# Patient Record
Sex: MALE | Race: BLACK OR AFRICAN AMERICAN
[De-identification: names, ages, dates, MRNs, and addresses within clinical notes are randomized per-mention and may not be internally consistent; named-entity substitution may affect disease eponyms.]

---

## 2018-12-08 ENCOUNTER — HOSPITAL ENCOUNTER (EMERGENCY)
Dept: HOSPITAL 92 - ERS | Age: 23
Discharge: HOME | End: 2018-12-08
Payer: SELF-PAY

## 2018-12-08 DIAGNOSIS — F20.9: ICD-10-CM

## 2018-12-08 DIAGNOSIS — Z79.899: ICD-10-CM
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
## 2018-12-08 DIAGNOSIS — M79.604: Primary | ICD-10-CM

## 2018-12-08 DIAGNOSIS — F31.9: ICD-10-CM

## 2018-12-08 PROCEDURE — 99283 EMERGENCY DEPT VISIT LOW MDM: CPT

## 2020-06-01 ENCOUNTER — HOSPITAL ENCOUNTER (EMERGENCY)
Dept: HOSPITAL 92 - ERS | Age: 25
Discharge: PSYCHIATRIC OR SUBSTANCE USE FACILITY | End: 2020-06-01
Payer: MEDICARE

## 2020-06-01 DIAGNOSIS — F31.9: ICD-10-CM

## 2020-06-01 DIAGNOSIS — R45.1: ICD-10-CM

## 2020-06-01 DIAGNOSIS — F20.9: ICD-10-CM

## 2020-06-01 DIAGNOSIS — F29: Primary | ICD-10-CM

## 2020-06-01 DIAGNOSIS — Z79.899: ICD-10-CM

## 2020-06-01 LAB
ALBUMIN SERPL BCG-MCNC: 4.7 G/DL (ref 3.5–5)
ALP SERPL-CCNC: 48 U/L (ref 40–110)
ALT SERPL W P-5'-P-CCNC: 24 U/L (ref 8–55)
ANION GAP SERPL CALC-SCNC: 13 MMOL/L (ref 10–20)
APAP SERPL-MCNC: (no result) MCG/ML (ref 10–30)
AST SERPL-CCNC: 31 U/L (ref 5–34)
BASOPHILS # BLD AUTO: 0.1 THOU/UL (ref 0–0.2)
BASOPHILS NFR BLD AUTO: 0.9 % (ref 0–1)
BILIRUB SERPL-MCNC: 0.4 MG/DL (ref 0.2–1.2)
BUN SERPL-MCNC: 15 MG/DL (ref 8.9–20.6)
CALCIUM SERPL-MCNC: 9.9 MG/DL (ref 7.8–10.44)
CHLORIDE SERPL-SCNC: 105 MMOL/L (ref 98–107)
CK SERPL-CCNC: 513 U/L (ref 30–200)
CO2 SERPL-SCNC: 26 MMOL/L (ref 22–29)
CREAT CL PREDICTED SERPL C-G-VRATE: 0 ML/MIN (ref 70–130)
DRUG SCREEN CUTOFF: (no result)
EOSINOPHIL # BLD AUTO: 0 THOU/UL (ref 0–0.7)
EOSINOPHIL NFR BLD AUTO: 0.4 % (ref 0–10)
GLOBULIN SER CALC-MCNC: 3 G/DL (ref 2.4–3.5)
GLUCOSE SERPL-MCNC: 78 MG/DL (ref 70–105)
HGB BLD-MCNC: 14.1 G/DL (ref 14–18)
LYMPHOCYTES # BLD: 1.6 THOU/UL (ref 1.2–3.4)
LYMPHOCYTES NFR BLD AUTO: 21.8 % (ref 21–51)
MCH RBC QN AUTO: 32 PG (ref 27–31)
MCV RBC AUTO: 100 FL (ref 78–98)
MEDTOX CONTROL LINE VALID?: (no result)
MEDTOX READER #: (no result)
MONOCYTES # BLD AUTO: 0.6 THOU/UL (ref 0.11–0.59)
MONOCYTES NFR BLD AUTO: 8.2 % (ref 0–10)
NEUTROPHILS # BLD AUTO: 5 THOU/UL (ref 1.4–6.5)
NEUTROPHILS NFR BLD AUTO: 68.7 % (ref 42–75)
PLATELET # BLD AUTO: 30 THOU/UL (ref 130–400)
POTASSIUM SERPL-SCNC: 4.6 MMOL/L (ref 3.5–5.1)
PROT UR STRIP.AUTO-MCNC: 10 MG/DL
RBC # BLD AUTO: 4.4 MILL/UL (ref 4.7–6.1)
SALICYLATES SERPL-MCNC: (no result) MG/DL (ref 15–30)
SODIUM SERPL-SCNC: 139 MMOL/L (ref 136–145)
WBC # BLD AUTO: 7.3 THOU/UL (ref 4.8–10.8)

## 2020-06-01 PROCEDURE — 80053 COMPREHEN METABOLIC PANEL: CPT

## 2020-06-01 PROCEDURE — 85025 COMPLETE CBC W/AUTO DIFF WBC: CPT

## 2020-06-01 PROCEDURE — 81003 URINALYSIS AUTO W/O SCOPE: CPT

## 2020-06-01 PROCEDURE — 82550 ASSAY OF CK (CPK): CPT

## 2020-06-01 PROCEDURE — 80306 DRUG TEST PRSMV INSTRMNT: CPT

## 2020-06-01 PROCEDURE — 80307 DRUG TEST PRSMV CHEM ANLYZR: CPT

## 2020-06-01 PROCEDURE — 93005 ELECTROCARDIOGRAM TRACING: CPT

## 2020-06-01 PROCEDURE — 84443 ASSAY THYROID STIM HORMONE: CPT

## 2020-06-06 NOTE — EKG
Test Reason : 

Blood Pressure : ***/*** mmHG

Vent. Rate : 064 BPM     Atrial Rate : 064 BPM

   P-R Int : 184 ms          QRS Dur : 088 ms

    QT Int : 346 ms       P-R-T Axes : 034 061 027 degrees

   QTc Int : 356 ms

 

Sinus rhythm with sinus arrhythmia with occasional Premature ventricular complexes

Early repolarization

Otherwise normal ECG

 

Confirmed by TURNER MURILLO ([id number]),  TEMI JONES ([id number]) on 6/6/2020 1:56:23 PM

 

Referred By:             Confirmed By:TURNER MURILLO

## 2020-06-15 ENCOUNTER — HOSPITAL ENCOUNTER (INPATIENT)
Dept: HOSPITAL 92 - ERS | Age: 25
LOS: 9 days | Discharge: HOME | DRG: 3 | End: 2020-06-24
Attending: SURGERY | Admitting: SURGERY
Payer: MEDICARE

## 2020-06-15 VITALS — BODY MASS INDEX: 18.1 KG/M2

## 2020-06-15 DIAGNOSIS — E83.39: ICD-10-CM

## 2020-06-15 DIAGNOSIS — E83.51: ICD-10-CM

## 2020-06-15 DIAGNOSIS — E87.6: ICD-10-CM

## 2020-06-15 DIAGNOSIS — D62: ICD-10-CM

## 2020-06-15 DIAGNOSIS — J96.00: ICD-10-CM

## 2020-06-15 DIAGNOSIS — R40.2432: ICD-10-CM

## 2020-06-15 DIAGNOSIS — E83.42: ICD-10-CM

## 2020-06-15 DIAGNOSIS — S02.69XA: Primary | ICD-10-CM

## 2020-06-15 DIAGNOSIS — S11.90XA: ICD-10-CM

## 2020-06-15 DIAGNOSIS — N17.9: ICD-10-CM

## 2020-06-15 DIAGNOSIS — S45.192A: ICD-10-CM

## 2020-06-15 DIAGNOSIS — F20.9: ICD-10-CM
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
## 2020-06-15 DIAGNOSIS — S41.001A: ICD-10-CM

## 2020-06-15 DIAGNOSIS — T79.4XXA: ICD-10-CM

## 2020-06-15 DIAGNOSIS — E87.2: ICD-10-CM

## 2020-06-15 DIAGNOSIS — D69.6: ICD-10-CM

## 2020-06-15 DIAGNOSIS — S41.101A: ICD-10-CM

## 2020-06-15 LAB
ALBUMIN SERPL BCG-MCNC: 2.7 G/DL (ref 3.5–5)
ALP SERPL-CCNC: 27 U/L (ref 40–110)
ALT SERPL W P-5'-P-CCNC: 20 U/L (ref 8–55)
ANALYZER IN CARDIO: (no result)
ANALYZER IN CARDIO: (no result)
ANION GAP SERPL CALC-SCNC: 10 MMOL/L (ref 10–20)
ANION GAP SERPL CALC-SCNC: 25 MMOL/L (ref 10–20)
APTT PPP: 34.9 SEC (ref 22.9–36.1)
AST SERPL-CCNC: 27 U/L (ref 5–34)
BASE EXCESS STD BLDA CALC-SCNC: -3.9 MEQ/L
BASE EXCESS STD BLDV CALC-SCNC: -7.1 MEQ/L
BASOPHILS # BLD AUTO: 0 THOU/UL (ref 0–0.2)
BASOPHILS NFR BLD AUTO: 0.1 % (ref 0–1)
BILIRUB SERPL-MCNC: 0.2 MG/DL (ref 0.2–1.2)
BUN SERPL-MCNC: 12 MG/DL (ref 8.9–20.6)
BUN SERPL-MCNC: 14 MG/DL (ref 8.9–20.6)
BURR CELLS BLD QL SMEAR: (no result) (100X)
CA-I BLDA-SCNC: 1.17 MMOL/L (ref 1.12–1.3)
CA-I BLDV-MCNC: 1.12 MMOL/L (ref 1.16–1.32)
CALCIUM SERPL-MCNC: 13.3 MG/DL (ref 7.8–10.44)
CALCIUM SERPL-MCNC: 8.3 MG/DL (ref 7.8–10.44)
CHLORIDE BLDV-SCNC: 106 MMOL/L (ref 98–106)
CHLORIDE SERPL-SCNC: 108 MMOL/L (ref 98–107)
CHLORIDE SERPL-SCNC: 113 MMOL/L (ref 98–107)
CO2 SERPL-SCNC: 21 MMOL/L (ref 22–29)
CO2 SERPL-SCNC: 9 MMOL/L (ref 22–29)
CREAT CL PREDICTED SERPL C-G-VRATE: 0 ML/MIN (ref 70–130)
CREAT CL PREDICTED SERPL C-G-VRATE: 113 ML/MIN (ref 70–130)
EOSINOPHIL # BLD AUTO: 0.1 THOU/UL (ref 0–0.7)
EOSINOPHIL NFR BLD AUTO: 0.9 % (ref 0–10)
GLOBULIN SER CALC-MCNC: 1.6 G/DL (ref 2.4–3.5)
GLUCOSE SERPL-MCNC: 126 MG/DL (ref 70–105)
GLUCOSE SERPL-MCNC: 303 MG/DL (ref 70–105)
GLUCOSE UR STRIP-MCNC: 100 MG/DL
HCO3 BLDA-SCNC: 18.9 MEQ/L (ref 22–28)
HCO3 BLDV-SCNC: 20 MEQ/L (ref 22–28)
HCT VFR BLDA CALC: 31 % (ref 42–52)
HCT VFR BLDV CALC: 25 % (ref 42–52)
HGB BLD-MCNC: 10.5 G/DL (ref 14–18)
HGB BLD-MCNC: 7.8 G/DL (ref 14–18)
HGB BLDA-MCNC: 10.5 G/DL (ref 14–18)
HGB BLDV-MCNC: 8.6 G/DL (ref 13.2–17.3)
INR PPP: 1.5
LIPASE SERPL-CCNC: 25 U/L (ref 8–78)
LYMPHOCYTES # BLD: 1.1 THOU/UL (ref 1.2–3.4)
LYMPHOCYTES NFR BLD AUTO: 8.3 % (ref 21–51)
MAGNESIUM SERPL-MCNC: 1.7 MG/DL (ref 1.6–2.6)
MCH RBC QN AUTO: 30 PG (ref 27–31)
MCH RBC QN AUTO: 32.7 PG (ref 27–31)
MCV RBC AUTO: 90.2 FL (ref 78–98)
MCV RBC AUTO: 93.3 FL (ref 78–98)
MDIFF COMPLETE?: YES
MDIFF COMPLETE?: YES
MONOCYTES # BLD AUTO: 0.3 THOU/UL (ref 0.11–0.59)
MONOCYTES NFR BLD AUTO: 2.3 % (ref 0–10)
NEUTROPHILS # BLD AUTO: 11.8 THOU/UL (ref 1.4–6.5)
NEUTROPHILS NFR BLD AUTO: 88.5 % (ref 42–75)
PCO2 BLDA: 27.2 MMHG (ref 35–45)
PH BLDA: 7.46 [PH] (ref 7.35–7.45)
PLATELET # BLD AUTO: 60 THOU/UL (ref 130–400)
PLATELET # BLD AUTO: 77 THOU/UL (ref 130–400)
PO2 BLDA: 399.4 MMHG (ref 80–100)
POLYCHROMASIA BLD QL SMEAR: (no result) (100X)
POLYCHROMASIA BLD QL SMEAR: (no result) (100X)
POTASSIUM BLD-SCNC: 4.02 MMOL/L (ref 3.7–5.3)
POTASSIUM BLDV-SCNC: 5.11 MMOL/L (ref 3.7–5.3)
POTASSIUM SERPL-SCNC: 4 MMOL/L (ref 3.5–5.1)
POTASSIUM SERPL-SCNC: 5.2 MMOL/L (ref 3.5–5.1)
PROTHROMBIN TIME: 17.7 SEC (ref 12–14.7)
RBC # BLD AUTO: 2.61 MILL/UL (ref 4.7–6.1)
RBC # BLD AUTO: 3.22 MILL/UL (ref 4.7–6.1)
SCHISTOCYTES BLD QL SMEAR: (no result) (100X)
SODIUM BLDV-SCNC: 134.4 MMOL/L (ref 133–146)
SODIUM SERPL-SCNC: 137 MMOL/L (ref 136–145)
SODIUM SERPL-SCNC: 140 MMOL/L (ref 136–145)
SPECIMEN DRAWN FROM PATIENT: (no result)
WBC # BLD AUTO: 13.3 THOU/UL (ref 4.8–10.8)
WBC # BLD AUTO: 13.6 THOU/UL (ref 4.8–10.8)
WBC UR QL AUTO: (no result) HPF (ref 0–3)

## 2020-06-15 PROCEDURE — 93005 ELECTROCARDIOGRAM TRACING: CPT

## 2020-06-15 PROCEDURE — 86850 RBC ANTIBODY SCREEN: CPT

## 2020-06-15 PROCEDURE — P9059 PLASMA, FRZ BETWEEN 8-24HOUR: HCPCS

## 2020-06-15 PROCEDURE — 84100 ASSAY OF PHOSPHORUS: CPT

## 2020-06-15 PROCEDURE — 03L80ZZ OCCLUSION OF LEFT BRACHIAL ARTERY, OPEN APPROACH: ICD-10-PCS | Performed by: THORACIC SURGERY (CARDIOTHORACIC VASCULAR SURGERY)

## 2020-06-15 PROCEDURE — 90715 TDAP VACCINE 7 YRS/> IM: CPT

## 2020-06-15 PROCEDURE — 80307 DRUG TEST PRSMV CHEM ANLYZR: CPT

## 2020-06-15 PROCEDURE — 96374 THER/PROPH/DIAG INJ IV PUSH: CPT

## 2020-06-15 PROCEDURE — 71045 X-RAY EXAM CHEST 1 VIEW: CPT

## 2020-06-15 PROCEDURE — 96365 THER/PROPH/DIAG IV INF INIT: CPT

## 2020-06-15 PROCEDURE — 70486 CT MAXILLOFACIAL W/O DYE: CPT

## 2020-06-15 PROCEDURE — 80053 COMPREHEN METABOLIC PANEL: CPT

## 2020-06-15 PROCEDURE — 86900 BLOOD TYPING SEROLOGIC ABO: CPT

## 2020-06-15 PROCEDURE — 0BJ08ZZ INSPECTION OF TRACHEOBRONCHIAL TREE, VIA NATURAL OR ARTIFICIAL OPENING ENDOSCOPIC: ICD-10-PCS | Performed by: SURGERY

## 2020-06-15 PROCEDURE — 83605 ASSAY OF LACTIC ACID: CPT

## 2020-06-15 PROCEDURE — P9048 PLASMAPROTEIN FRACT,5%,250ML: HCPCS

## 2020-06-15 PROCEDURE — 81003 URINALYSIS AUTO W/O SCOPE: CPT

## 2020-06-15 PROCEDURE — 94002 VENT MGMT INPAT INIT DAY: CPT

## 2020-06-15 PROCEDURE — 93970 EXTREMITY STUDY: CPT

## 2020-06-15 PROCEDURE — 85027 COMPLETE CBC AUTOMATED: CPT

## 2020-06-15 PROCEDURE — P9016 RBC LEUKOCYTES REDUCED: HCPCS

## 2020-06-15 PROCEDURE — 02HV33Z INSERTION OF INFUSION DEVICE INTO SUPERIOR VENA CAVA, PERCUTANEOUS APPROACH: ICD-10-PCS | Performed by: THORACIC SURGERY (CARDIOTHORACIC VASCULAR SURGERY)

## 2020-06-15 PROCEDURE — 30233L1 TRANSFUSION OF NONAUTOLOGOUS FRESH PLASMA INTO PERIPHERAL VEIN, PERCUTANEOUS APPROACH: ICD-10-PCS | Performed by: SURGERY

## 2020-06-15 PROCEDURE — 90471 IMMUNIZATION ADMIN: CPT

## 2020-06-15 PROCEDURE — C9113 INJ PANTOPRAZOLE SODIUM, VIA: HCPCS

## 2020-06-15 PROCEDURE — 85007 BL SMEAR W/DIFF WBC COUNT: CPT

## 2020-06-15 PROCEDURE — 85730 THROMBOPLASTIN TIME PARTIAL: CPT

## 2020-06-15 PROCEDURE — 82805 BLOOD GASES W/O2 SATURATION: CPT

## 2020-06-15 PROCEDURE — 80048 BASIC METABOLIC PNL TOTAL CA: CPT

## 2020-06-15 PROCEDURE — G0390 TRAUMA RESPONS W/HOSP CRITI: HCPCS

## 2020-06-15 PROCEDURE — 36416 COLLJ CAPILLARY BLOOD SPEC: CPT

## 2020-06-15 PROCEDURE — 70450 CT HEAD/BRAIN W/O DYE: CPT

## 2020-06-15 PROCEDURE — 94640 AIRWAY INHALATION TREATMENT: CPT

## 2020-06-15 PROCEDURE — 83735 ASSAY OF MAGNESIUM: CPT

## 2020-06-15 PROCEDURE — 96375 TX/PRO/DX INJ NEW DRUG ADDON: CPT

## 2020-06-15 PROCEDURE — 85025 COMPLETE CBC W/AUTO DIFF WBC: CPT

## 2020-06-15 PROCEDURE — 30233N1 TRANSFUSION OF NONAUTOLOGOUS RED BLOOD CELLS INTO PERIPHERAL VEIN, PERCUTANEOUS APPROACH: ICD-10-PCS | Performed by: SURGERY

## 2020-06-15 PROCEDURE — 96366 THER/PROPH/DIAG IV INF ADDON: CPT

## 2020-06-15 PROCEDURE — 36556 INSERT NON-TUNNEL CV CATH: CPT

## 2020-06-15 PROCEDURE — 74177 CT ABD & PELVIS W/CONTRAST: CPT

## 2020-06-15 PROCEDURE — 85610 PROTHROMBIN TIME: CPT

## 2020-06-15 PROCEDURE — 86901 BLOOD TYPING SEROLOGIC RH(D): CPT

## 2020-06-15 PROCEDURE — 94003 VENT MGMT INPAT SUBQ DAY: CPT

## 2020-06-15 PROCEDURE — 81015 MICROSCOPIC EXAM OF URINE: CPT

## 2020-06-15 PROCEDURE — 71260 CT THORAX DX C+: CPT

## 2020-06-15 PROCEDURE — 30233K1 TRANSFUSION OF NONAUTOLOGOUS FROZEN PLASMA INTO PERIPHERAL VEIN, PERCUTANEOUS APPROACH: ICD-10-PCS | Performed by: SURGERY

## 2020-06-15 PROCEDURE — 36430 TRANSFUSION BLD/BLD COMPNT: CPT

## 2020-06-15 PROCEDURE — 83690 ASSAY OF LIPASE: CPT

## 2020-06-15 PROCEDURE — 36415 COLL VENOUS BLD VENIPUNCTURE: CPT

## 2020-06-15 PROCEDURE — 70498 CT ANGIOGRAPHY NECK: CPT

## 2020-06-15 PROCEDURE — 51702 INSERT TEMP BLADDER CATH: CPT

## 2020-06-15 RX ADMIN — CEFAZOLIN SODIUM SCH MLS: 2 SOLUTION INTRAVENOUS at 20:10

## 2020-06-15 RX ADMIN — FENTANYL CITRATE SCH MLS: 50 INJECTION, SOLUTION INTRAMUSCULAR; INTRAVENOUS at 17:08
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
## 2020-06-16 LAB
ANALYZER IN CARDIO: (no result)
ANION GAP SERPL CALC-SCNC: 7 MMOL/L (ref 10–20)
BASE EXCESS STD BLDA CALC-SCNC: -2.2 MEQ/L
BASOPHILS # BLD AUTO: 0 THOU/UL (ref 0–0.2)
BASOPHILS NFR BLD AUTO: 0.1 % (ref 0–1)
BUN SERPL-MCNC: 13 MG/DL (ref 8.9–20.6)
CA-I BLDA-SCNC: 1.12 MMOL/L (ref 1.12–1.3)
CALCIUM SERPL-MCNC: 7.9 MG/DL (ref 7.8–10.44)
CHLORIDE SERPL-SCNC: 110 MMOL/L (ref 98–107)
CO2 SERPL-SCNC: 26 MMOL/L (ref 22–29)
CREAT CL PREDICTED SERPL C-G-VRATE: 95 ML/MIN (ref 70–130)
EOSINOPHIL # BLD AUTO: 0 THOU/UL (ref 0–0.7)
EOSINOPHIL NFR BLD AUTO: 0.1 % (ref 0–10)
GLUCOSE SERPL-MCNC: 90 MG/DL (ref 70–105)
HCO3 BLDA-SCNC: 21.4 MEQ/L (ref 22–28)
HCT VFR BLDA CALC: 25 % (ref 42–52)
HGB BLD-MCNC: 9.3 G/DL (ref 14–18)
HGB BLDA-MCNC: 8.5 G/DL (ref 14–18)
LYMPHOCYTES # BLD: 1.5 THOU/UL (ref 1.2–3.4)
LYMPHOCYTES NFR BLD AUTO: 12.9 % (ref 21–51)
MAGNESIUM SERPL-MCNC: 1.5 MG/DL (ref 1.6–2.6)
MCH RBC QN AUTO: 30.9 PG (ref 27–31)
MCV RBC AUTO: 90.7 FL (ref 78–98)
MONOCYTES # BLD AUTO: 1 THOU/UL (ref 0.11–0.59)
MONOCYTES NFR BLD AUTO: 8.6 % (ref 0–10)
NEUTROPHILS # BLD AUTO: 9.3 THOU/UL (ref 1.4–6.5)
NEUTROPHILS NFR BLD AUTO: 78.3 % (ref 42–75)
O2 A-A PPRESDIFF RESPIRATORY: 57.23 MM[HG] (ref 0–20)
PCO2 BLDA: 31.9 MMHG (ref 35–45)
PH BLDA: 7.45 [PH] (ref 7.35–7.45)
PLATELET # BLD AUTO: 66 THOU/UL (ref 130–400)
PO2 BLDA: 188.1 MMHG (ref 80–100)
POTASSIUM BLD-SCNC: 3.85 MMOL/L (ref 3.7–5.3)
POTASSIUM SERPL-SCNC: 4.1 MMOL/L (ref 3.5–5.1)
RBC # BLD AUTO: 3.01 MILL/UL (ref 4.7–6.1)
SODIUM SERPL-SCNC: 139 MMOL/L (ref 136–145)
SPECIMEN DRAWN FROM PATIENT: (no result)
WBC # BLD AUTO: 11.8 THOU/UL (ref 4.8–10.8)

## 2020-06-16 PROCEDURE — 0DH63UZ INSERTION OF FEEDING DEVICE INTO STOMACH, PERCUTANEOUS APPROACH: ICD-10-PCS | Performed by: SURGERY

## 2020-06-16 PROCEDURE — 5A1955Z RESPIRATORY VENTILATION, GREATER THAN 96 CONSECUTIVE HOURS: ICD-10-PCS | Performed by: SURGERY

## 2020-06-16 PROCEDURE — 0B113F4 BYPASS TRACHEA TO CUTANEOUS WITH TRACHEOSTOMY DEVICE, PERCUTANEOUS APPROACH: ICD-10-PCS | Performed by: SURGERY

## 2020-06-16 RX ADMIN — CEFAZOLIN SODIUM SCH MLS: 2 SOLUTION INTRAVENOUS at 05:16

## 2020-06-16 RX ADMIN — FENTANYL CITRATE SCH MLS: 50 INJECTION, SOLUTION INTRAMUSCULAR; INTRAVENOUS at 11:33

## 2020-06-16 RX ADMIN — CEFAZOLIN SODIUM SCH MLS: 2 SOLUTION INTRAVENOUS at 10:25

## 2020-06-16 RX ADMIN — CEFAZOLIN SODIUM SCH MLS: 2 SOLUTION INTRAVENOUS at 21:04
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
## 2020-06-17 LAB
ANION GAP SERPL CALC-SCNC: 11 MMOL/L (ref 10–20)
BUN SERPL-MCNC: 16 MG/DL (ref 8.9–20.6)
CALCIUM SERPL-MCNC: 7.7 MG/DL (ref 7.8–10.44)
CHLORIDE SERPL-SCNC: 109 MMOL/L (ref 98–107)
CO2 SERPL-SCNC: 23 MMOL/L (ref 22–29)
CREAT CL PREDICTED SERPL C-G-VRATE: 102 ML/MIN (ref 70–130)
GLUCOSE SERPL-MCNC: 102 MG/DL (ref 70–105)
HGB BLD-MCNC: 7 G/DL (ref 14–18)
MAGNESIUM SERPL-MCNC: 1.9 MG/DL (ref 1.6–2.6)
MCH RBC QN AUTO: 31.1 PG (ref 27–31)
MCV RBC AUTO: 94.7 FL (ref 78–98)
MDIFF COMPLETE?: YES
PLATELET # BLD AUTO: 52 THOU/UL (ref 130–400)
POLYCHROMASIA BLD QL SMEAR: (no result) (100X)
POTASSIUM SERPL-SCNC: 4.4 MMOL/L (ref 3.5–5.1)
RBC # BLD AUTO: 2.23 MILL/UL (ref 4.7–6.1)
SODIUM SERPL-SCNC: 139 MMOL/L (ref 136–145)
WBC # BLD AUTO: 12.9 THOU/UL (ref 4.8–10.8)

## 2020-06-17 RX ADMIN — DOCUSATE SODIUM 50 MG AND SENNOSIDES 8.6 MG SCH TAB: 8.6; 5 TABLET, FILM COATED ORAL at 21:04

## 2020-06-17 RX ADMIN — CEFAZOLIN SODIUM SCH MLS: 2 SOLUTION INTRAVENOUS at 03:23

## 2020-06-17 RX ADMIN — CEFAZOLIN SODIUM SCH MLS: 2 SOLUTION INTRAVENOUS at 11:30

## 2020-06-17 RX ADMIN — CEFAZOLIN SODIUM SCH MLS: 2 SOLUTION INTRAVENOUS at 21:04
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
## 2020-06-18 LAB
ANION GAP SERPL CALC-SCNC: 8 MMOL/L (ref 10–20)
BUN SERPL-MCNC: 15 MG/DL (ref 8.9–20.6)
CALCIUM SERPL-MCNC: 7.9 MG/DL (ref 7.8–10.44)
CHLORIDE SERPL-SCNC: 105 MMOL/L (ref 98–107)
CO2 SERPL-SCNC: 29 MMOL/L (ref 22–29)
CREAT CL PREDICTED SERPL C-G-VRATE: 131 ML/MIN (ref 70–130)
GLUCOSE SERPL-MCNC: 107 MG/DL (ref 70–105)
HGB BLD-MCNC: 5.9 G/DL (ref 14–18)
MAGNESIUM SERPL-MCNC: 1.9 MG/DL (ref 1.6–2.6)
MCH RBC QN AUTO: 31 PG (ref 27–31)
MCV RBC AUTO: 94.4 FL (ref 78–98)
MDIFF COMPLETE?: YES
PLATELET # BLD AUTO: 54 THOU/UL (ref 130–400)
POTASSIUM SERPL-SCNC: 3.9 MMOL/L (ref 3.5–5.1)
RBC # BLD AUTO: 1.89 MILL/UL (ref 4.7–6.1)
SODIUM SERPL-SCNC: 138 MMOL/L (ref 136–145)
WBC # BLD AUTO: 8.6 THOU/UL (ref 4.8–10.8)

## 2020-06-18 PROCEDURE — 05H533Z INSERTION OF INFUSION DEVICE INTO RIGHT SUBCLAVIAN VEIN, PERCUTANEOUS APPROACH: ICD-10-PCS | Performed by: SURGERY

## 2020-06-18 PROCEDURE — 0NSV04Z REPOSITION LEFT MANDIBLE WITH INTERNAL FIXATION DEVICE, OPEN APPROACH: ICD-10-PCS | Performed by: SURGERY

## 2020-06-18 RX ADMIN — CEFAZOLIN SODIUM SCH MLS: 2 SOLUTION INTRAVENOUS at 12:43

## 2020-06-18 RX ADMIN — DIVALPROEX SODIUM SCH MG: 500 TABLET, DELAYED RELEASE ORAL at 08:47

## 2020-06-18 RX ADMIN — CEFAZOLIN SODIUM SCH MLS: 2 SOLUTION INTRAVENOUS at 03:55

## 2020-06-18 RX ADMIN — DIVALPROEX SODIUM SCH: 500 TABLET, DELAYED RELEASE ORAL at 21:12

## 2020-06-18 RX ADMIN — DIVALPROEX SODIUM SCH MG: 125 CAPSULE, COATED PELLETS ORAL at 21:18

## 2020-06-18 RX ADMIN — Medication SCH MG: at 21:01

## 2020-06-18 RX ADMIN — DOCUSATE SODIUM 50 MG AND SENNOSIDES 8.6 MG SCH TAB: 8.6; 5 TABLET, FILM COATED ORAL at 08:48

## 2020-06-18 RX ADMIN — DOCUSATE SODIUM 50 MG AND SENNOSIDES 8.6 MG SCH TAB: 8.6; 5 TABLET, FILM COATED ORAL at 20:55

## 2020-06-18 RX ADMIN — CEFAZOLIN SODIUM SCH MLS: 2 SOLUTION INTRAVENOUS at 20:54

## 2020-06-18 RX ADMIN — Medication SCH MG: at 08:47
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
## 2020-06-18 NOTE — RAD
CHEST 1 VIEW:

 

Date:  06/18/2020

 

HISTORY:  

Right subclavian line placement. 

 

FINDINGS:

Tracheostomy tube in place. Right subclavian central line placed with tip in the superior vena cava. 
No pneumothorax or pleural effusion. 

 

IMPRESSION: 

Right central line placement without pneumothorax or pleural effusion. No evidence for other acute pr
ocess. 

 

 

POS: [facility]
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
## 2020-06-19 LAB
ANION GAP SERPL CALC-SCNC: 8 MMOL/L (ref 10–20)
BUN SERPL-MCNC: 10 MG/DL (ref 8.9–20.6)
CALCIUM SERPL-MCNC: 7.7 MG/DL (ref 7.8–10.44)
CHLORIDE SERPL-SCNC: 105 MMOL/L (ref 98–107)
CO2 SERPL-SCNC: 30 MMOL/L (ref 22–29)
CREAT CL PREDICTED SERPL C-G-VRATE: 148 ML/MIN (ref 70–130)
GLUCOSE SERPL-MCNC: 115 MG/DL (ref 70–105)
HGB BLD-MCNC: 8.6 G/DL (ref 14–18)
MAGNESIUM SERPL-MCNC: 1.8 MG/DL (ref 1.6–2.6)
MCH RBC QN AUTO: 30.3 PG (ref 27–31)
MCV RBC AUTO: 92.7 FL (ref 78–98)
MDIFF COMPLETE?: YES
PLATELET # BLD AUTO: 68 THOU/UL (ref 130–400)
POTASSIUM SERPL-SCNC: 3.7 MMOL/L (ref 3.5–5.1)
RBC # BLD AUTO: 2.82 MILL/UL (ref 4.7–6.1)
SODIUM SERPL-SCNC: 139 MMOL/L (ref 136–145)
WBC # BLD AUTO: 8.4 THOU/UL (ref 4.8–10.8)

## 2020-06-19 RX ADMIN — Medication SCH MG: at 21:03

## 2020-06-19 RX ADMIN — Medication SCH MG: at 08:10

## 2020-06-19 RX ADMIN — CEFAZOLIN SODIUM SCH MLS: 2 SOLUTION INTRAVENOUS at 21:04

## 2020-06-19 RX ADMIN — DOCUSATE SODIUM 50 MG AND SENNOSIDES 8.6 MG SCH TAB: 8.6; 5 TABLET, FILM COATED ORAL at 21:03

## 2020-06-19 RX ADMIN — DOCUSATE SODIUM 50 MG AND SENNOSIDES 8.6 MG SCH TAB: 8.6; 5 TABLET, FILM COATED ORAL at 08:10

## 2020-06-19 RX ADMIN — DIVALPROEX SODIUM SCH MG: 125 CAPSULE, COATED PELLETS ORAL at 08:28

## 2020-06-19 RX ADMIN — CEFAZOLIN SODIUM SCH MLS: 2 SOLUTION INTRAVENOUS at 11:41

## 2020-06-19 RX ADMIN — DIVALPROEX SODIUM SCH MG: 125 CAPSULE, COATED PELLETS ORAL at 21:03

## 2020-06-19 RX ADMIN — CEFAZOLIN SODIUM SCH MLS: 2 SOLUTION INTRAVENOUS at 03:59
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
## 2020-06-20 LAB
ANION GAP SERPL CALC-SCNC: 9 MMOL/L (ref 10–20)
BUN SERPL-MCNC: 9 MG/DL (ref 8.9–20.6)
CALCIUM SERPL-MCNC: 7.8 MG/DL (ref 7.8–10.44)
CHLORIDE SERPL-SCNC: 102 MMOL/L (ref 98–107)
CO2 SERPL-SCNC: 31 MMOL/L (ref 22–29)
CREAT CL PREDICTED SERPL C-G-VRATE: 152 ML/MIN (ref 70–130)
GLUCOSE SERPL-MCNC: 133 MG/DL (ref 70–105)
HGB BLD-MCNC: 9.5 G/DL (ref 14–18)
MAGNESIUM SERPL-MCNC: 1.8 MG/DL (ref 1.6–2.6)
MCH RBC QN AUTO: 31.8 PG (ref 27–31)
MCV RBC AUTO: 93.4 FL (ref 78–98)
MDIFF COMPLETE?: YES
PLATELET # BLD AUTO: 112 THOU/UL (ref 130–400)
POTASSIUM SERPL-SCNC: 3.4 MMOL/L (ref 3.5–5.1)
RBC # BLD AUTO: 2.99 MILL/UL (ref 4.7–6.1)
SODIUM SERPL-SCNC: 139 MMOL/L (ref 136–145)
WBC # BLD AUTO: 10.9 THOU/UL (ref 4.8–10.8)

## 2020-06-20 RX ADMIN — DIVALPROEX SODIUM SCH MG: 125 CAPSULE, COATED PELLETS ORAL at 08:43

## 2020-06-20 RX ADMIN — Medication SCH MG: at 08:42

## 2020-06-20 RX ADMIN — CEFAZOLIN SODIUM SCH MLS: 2 SOLUTION INTRAVENOUS at 05:37

## 2020-06-20 RX ADMIN — SCOPALAMINE SCH MG: 1 PATCH, EXTENDED RELEASE TRANSDERMAL at 14:10

## 2020-06-20 RX ADMIN — CEFAZOLIN SODIUM SCH MLS: 2 SOLUTION INTRAVENOUS at 20:44

## 2020-06-20 RX ADMIN — LOPERAMIDE HCL PRN MG: 1 SOLUTION ORAL at 17:20

## 2020-06-20 RX ADMIN — DOCUSATE SODIUM 50 MG AND SENNOSIDES 8.6 MG SCH: 8.6; 5 TABLET, FILM COATED ORAL at 23:24

## 2020-06-20 RX ADMIN — CEFAZOLIN SODIUM SCH MLS: 2 SOLUTION INTRAVENOUS at 14:10

## 2020-06-20 RX ADMIN — Medication SCH MG: at 20:44

## 2020-06-20 RX ADMIN — DIVALPROEX SODIUM SCH MG: 125 CAPSULE, COATED PELLETS ORAL at 20:42

## 2020-06-20 RX ADMIN — DOCUSATE SODIUM 50 MG AND SENNOSIDES 8.6 MG SCH: 8.6; 5 TABLET, FILM COATED ORAL at 08:10
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
## 2020-06-21 LAB
ANION GAP SERPL CALC-SCNC: 11 MMOL/L (ref 10–20)
BUN SERPL-MCNC: 12 MG/DL (ref 8.9–20.6)
CALCIUM SERPL-MCNC: 8.1 MG/DL (ref 7.8–10.44)
CHLORIDE SERPL-SCNC: 102 MMOL/L (ref 98–107)
CO2 SERPL-SCNC: 31 MMOL/L (ref 22–29)
CREAT CL PREDICTED SERPL C-G-VRATE: 150 ML/MIN (ref 70–130)
GLUCOSE SERPL-MCNC: 127 MG/DL (ref 70–105)
HGB BLD-MCNC: 9.6 G/DL (ref 14–18)
MAGNESIUM SERPL-MCNC: 2 MG/DL (ref 1.6–2.6)
MCH RBC QN AUTO: 29.5 PG (ref 27–31)
MCV RBC AUTO: 93.7 FL (ref 78–98)
MDIFF COMPLETE?: YES
PLATELET # BLD AUTO: 153 THOU/UL (ref 130–400)
POTASSIUM SERPL-SCNC: 3.6 MMOL/L (ref 3.5–5.1)
RBC # BLD AUTO: 3.25 MILL/UL (ref 4.7–6.1)
SODIUM SERPL-SCNC: 140 MMOL/L (ref 136–145)
WBC # BLD AUTO: 11.6 THOU/UL (ref 4.8–10.8)

## 2020-06-21 RX ADMIN — CEFAZOLIN SODIUM SCH MLS: 2 SOLUTION INTRAVENOUS at 20:43

## 2020-06-21 RX ADMIN — Medication SCH MG: at 09:14

## 2020-06-21 RX ADMIN — LOPERAMIDE HCL PRN MG: 1 SOLUTION ORAL at 00:08

## 2020-06-21 RX ADMIN — DIVALPROEX SODIUM SCH MG: 125 CAPSULE, COATED PELLETS ORAL at 20:47

## 2020-06-21 RX ADMIN — LOPERAMIDE HCL PRN MG: 1 SOLUTION ORAL at 05:47

## 2020-06-21 RX ADMIN — CEFAZOLIN SODIUM SCH MLS: 2 SOLUTION INTRAVENOUS at 05:48

## 2020-06-21 RX ADMIN — DIVALPROEX SODIUM SCH MG: 125 CAPSULE, COATED PELLETS ORAL at 09:16

## 2020-06-21 RX ADMIN — CEFAZOLIN SODIUM SCH MLS: 2 SOLUTION INTRAVENOUS at 11:42

## 2020-06-21 RX ADMIN — PSYLLIUM HUSK SCH PK: 3.4 POWDER ORAL at 20:47

## 2020-06-21 RX ADMIN — DOCUSATE SODIUM 50 MG AND SENNOSIDES 8.6 MG SCH: 8.6; 5 TABLET, FILM COATED ORAL at 21:10

## 2020-06-21 RX ADMIN — Medication SCH MG: at 20:47

## 2020-06-21 RX ADMIN — DOCUSATE SODIUM 50 MG AND SENNOSIDES 8.6 MG SCH: 8.6; 5 TABLET, FILM COATED ORAL at 09:17
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
## 2020-06-21 NOTE — PRG
DATE OF SERVICE:  06/20/2020



SUBJECTIVE:  The patient was seen during morning rounds on the surgical floor,

awake, alert, no distress.  The patient reports having diarrhea.  The patient's pain

is currently controlled at this time.  Nursing staff states that he refused his

dressing changes to his face by the day nurse.  The patient was instructed about the

importance of needing to change his bandages and the patient agrees to have them

done. 



OBJECTIVE:  VITAL SIGNS:  Blood pressure 132/78, pulse 92, temperature 98.8,

respirations 17, SpO2 99% on trach collar, 28% FiO2.  GENERAL:  Well-appearing young

male, awake, alert, no distress. 

RESPIRATORY:  Equal chest rise and fall, respirations are even and labored.



PLAN:  

1. Continue supportive care and pain regimen.  

2. Continue Imodium and Florastor.  

3. The patient has been encouraged to walk frequently and out of the bed during the

day. 

4. Continue tube feeds.







Job ID:  [id number]

## 2020-06-22 RX ADMIN — PSYLLIUM HUSK SCH: 3.4 POWDER ORAL at 11:56

## 2020-06-22 RX ADMIN — CHLORHEXIDINE GLUCONATE 0.12% ORAL RINSE SCH ML: 1.2 LIQUID ORAL at 09:26

## 2020-06-22 RX ADMIN — Medication SCH MG: at 18:03

## 2020-06-22 RX ADMIN — CHLORHEXIDINE GLUCONATE 0.12% ORAL RINSE SCH ML: 1.2 LIQUID ORAL at 21:06

## 2020-06-22 RX ADMIN — LOPERAMIDE HCL PRN MG: 1 SOLUTION ORAL at 00:26

## 2020-06-22 RX ADMIN — PSYLLIUM HUSK SCH: 3.4 POWDER ORAL at 21:06

## 2020-06-22 RX ADMIN — DOCUSATE SODIUM 50 MG AND SENNOSIDES 8.6 MG SCH: 8.6; 5 TABLET, FILM COATED ORAL at 11:57

## 2020-06-22 RX ADMIN — DOCUSATE SODIUM 50 MG AND SENNOSIDES 8.6 MG SCH: 8.6; 5 TABLET, FILM COATED ORAL at 21:07

## 2020-06-22 RX ADMIN — Medication SCH MG: at 09:30

## 2020-06-22 RX ADMIN — LOPERAMIDE HCL PRN MG: 1 SOLUTION ORAL at 09:25

## 2020-06-22 RX ADMIN — DIVALPROEX SODIUM SCH MG: 125 CAPSULE, COATED PELLETS ORAL at 09:26

## 2020-06-22 RX ADMIN — CEFAZOLIN SODIUM SCH MLS: 2 SOLUTION INTRAVENOUS at 04:34

## 2020-06-22 RX ADMIN — DIVALPROEX SODIUM SCH MG: 125 CAPSULE, COATED PELLETS ORAL at 21:06

## 2020-06-23 RX ADMIN — DIVALPROEX SODIUM SCH MG: 125 CAPSULE, COATED PELLETS ORAL at 20:45

## 2020-06-23 RX ADMIN — Medication SCH MG: at 08:58

## 2020-06-23 RX ADMIN — DIVALPROEX SODIUM SCH MG: 125 CAPSULE, COATED PELLETS ORAL at 09:01

## 2020-06-23 RX ADMIN — PSYLLIUM HUSK SCH: 3.4 POWDER ORAL at 09:04

## 2020-06-23 RX ADMIN — SCOPALAMINE SCH MG: 1 PATCH, EXTENDED RELEASE TRANSDERMAL at 14:29

## 2020-06-23 RX ADMIN — CHLORHEXIDINE GLUCONATE 0.12% ORAL RINSE SCH ML: 1.2 LIQUID ORAL at 20:44

## 2020-06-23 RX ADMIN — Medication SCH MG: at 17:06

## 2020-06-23 RX ADMIN — CHLORHEXIDINE GLUCONATE 0.12% ORAL RINSE SCH ML: 1.2 LIQUID ORAL at 09:00

## 2020-06-23 RX ADMIN — PSYLLIUM HUSK SCH PK: 3.4 POWDER ORAL at 20:45
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
## 2020-06-24 VITALS — SYSTOLIC BLOOD PRESSURE: 109 MMHG | DIASTOLIC BLOOD PRESSURE: 67 MMHG | TEMPERATURE: 98.8 F

## 2020-06-24 RX ADMIN — CHLORHEXIDINE GLUCONATE 0.12% ORAL RINSE SCH ML: 1.2 LIQUID ORAL at 08:56

## 2020-06-24 RX ADMIN — PSYLLIUM HUSK SCH: 3.4 POWDER ORAL at 08:55

## 2020-06-24 RX ADMIN — DIVALPROEX SODIUM SCH MG: 125 CAPSULE, COATED PELLETS ORAL at 08:56

## 2020-06-24 RX ADMIN — Medication SCH MG: at 08:55

## 2020-06-25 ENCOUNTER — HOSPITAL ENCOUNTER (EMERGENCY)
Dept: HOSPITAL 92 - ERS | Age: 25
Discharge: HOME | End: 2020-06-25
Payer: MEDICARE

## 2020-06-25 DIAGNOSIS — F31.9: ICD-10-CM

## 2020-06-25 DIAGNOSIS — J95.09: Primary | ICD-10-CM

## 2020-06-25 DIAGNOSIS — F20.9: ICD-10-CM

## 2020-06-25 PROCEDURE — 99283 EMERGENCY DEPT VISIT LOW MDM: CPT

## 2020-11-15 ENCOUNTER — HOSPITAL ENCOUNTER (OUTPATIENT)
Dept: HOSPITAL 92 - ERS | Age: 25
Setting detail: OBSERVATION
LOS: 1 days | Discharge: HOME HEALTH | End: 2020-11-16
Attending: FAMILY MEDICINE | Admitting: INTERNAL MEDICINE
Payer: MEDICARE

## 2020-11-15 VITALS — BODY MASS INDEX: 20.5 KG/M2

## 2020-11-15 DIAGNOSIS — F31.9: ICD-10-CM

## 2020-11-15 DIAGNOSIS — Z20.828: ICD-10-CM

## 2020-11-15 DIAGNOSIS — T81.41XA: Primary | ICD-10-CM

## 2020-11-15 DIAGNOSIS — F20.9: ICD-10-CM

## 2020-11-15 DIAGNOSIS — M21.332: ICD-10-CM

## 2020-11-15 DIAGNOSIS — Z79.899: ICD-10-CM

## 2020-11-15 LAB
ALBUMIN SERPL BCG-MCNC: 4.1 G/DL (ref 3.5–5)
ALP SERPL-CCNC: 49 U/L (ref 40–110)
ALT SERPL W P-5'-P-CCNC: 17 U/L (ref 8–55)
ANION GAP SERPL CALC-SCNC: 16 MMOL/L (ref 10–20)
AST SERPL-CCNC: 18 U/L (ref 5–34)
BASOPHILS # BLD AUTO: 0 THOU/UL (ref 0–0.2)
BASOPHILS NFR BLD AUTO: 0.3 % (ref 0–1)
BILIRUB SERPL-MCNC: 0.7 MG/DL (ref 0.2–1.2)
BUN SERPL-MCNC: 18 MG/DL (ref 8.9–20.6)
CALCIUM SERPL-MCNC: 9.3 MG/DL (ref 7.8–10.44)
CHLORIDE SERPL-SCNC: 99 MMOL/L (ref 98–107)
CO2 SERPL-SCNC: 26 MMOL/L (ref 22–29)
CREAT CL PREDICTED SERPL C-G-VRATE: 0 ML/MIN (ref 70–130)
EOSINOPHIL # BLD AUTO: 0.1 THOU/UL (ref 0–0.7)
EOSINOPHIL NFR BLD AUTO: 0.5 % (ref 0–10)
GLOBULIN SER CALC-MCNC: 3.5 G/DL (ref 2.4–3.5)
GLUCOSE SERPL-MCNC: 78 MG/DL (ref 70–105)
HGB BLD-MCNC: 12.7 G/DL (ref 14–18)
LYMPHOCYTES # BLD: 1.5 THOU/UL (ref 1.2–3.4)
LYMPHOCYTES NFR BLD AUTO: 12.8 % (ref 21–51)
MCH RBC QN AUTO: 30.9 PG (ref 27–31)
MCV RBC AUTO: 95.9 FL (ref 78–98)
MONOCYTES # BLD AUTO: 1.4 THOU/UL (ref 0.11–0.59)
MONOCYTES NFR BLD AUTO: 11.7 % (ref 0–10)
NEUTROPHILS # BLD AUTO: 8.9 THOU/UL (ref 1.4–6.5)
NEUTROPHILS NFR BLD AUTO: 74.7 % (ref 42–75)
PLATELET # BLD AUTO: 123 THOU/UL (ref 130–400)
POTASSIUM SERPL-SCNC: 4.2 MMOL/L (ref 3.5–5.1)
RBC # BLD AUTO: 4.12 MILL/UL (ref 4.7–6.1)
SODIUM SERPL-SCNC: 137 MMOL/L (ref 136–145)
WBC # BLD AUTO: 11.9 THOU/UL (ref 4.8–10.8)

## 2020-11-15 PROCEDURE — 99284 EMERGENCY DEPT VISIT MOD MDM: CPT

## 2020-11-15 PROCEDURE — 96375 TX/PRO/DX INJ NEW DRUG ADDON: CPT

## 2020-11-15 PROCEDURE — 80053 COMPREHEN METABOLIC PANEL: CPT

## 2020-11-15 PROCEDURE — 97139 UNLISTED THERAPEUTIC PX: CPT

## 2020-11-15 PROCEDURE — 96366 THER/PROPH/DIAG IV INF ADDON: CPT

## 2020-11-15 PROCEDURE — 80048 BASIC METABOLIC PNL TOTAL CA: CPT

## 2020-11-15 PROCEDURE — G0378 HOSPITAL OBSERVATION PER HR: HCPCS

## 2020-11-15 PROCEDURE — 10061 I&D ABSCESS COMP/MULTIPLE: CPT

## 2020-11-15 PROCEDURE — 87635 SARS-COV-2 COVID-19 AMP PRB: CPT

## 2020-11-15 PROCEDURE — 76999 ECHO EXAMINATION PROCEDURE: CPT

## 2020-11-15 PROCEDURE — 96365 THER/PROPH/DIAG IV INF INIT: CPT

## 2020-11-15 PROCEDURE — 83605 ASSAY OF LACTIC ACID: CPT

## 2020-11-15 PROCEDURE — U0003 INFECTIOUS AGENT DETECTION BY NUCLEIC ACID (DNA OR RNA); SEVERE ACUTE RESPIRATORY SYNDROME CORONAVIRUS 2 (SARS-COV-2) (CORONAVIRUS DISEASE [COVID-19]), AMPLIFIED PROBE TECHNIQUE, MAKING USE OF HIGH THROUGHPUT TECHNOLOGIES AS DESCRIBED BY CMS-2020-01-R: HCPCS

## 2020-11-15 PROCEDURE — 87149 DNA/RNA DIRECT PROBE: CPT

## 2020-11-15 PROCEDURE — 85025 COMPLETE CBC W/AUTO DIFF WBC: CPT

## 2020-11-15 PROCEDURE — 87040 BLOOD CULTURE FOR BACTERIA: CPT

## 2020-11-15 PROCEDURE — 36415 COLL VENOUS BLD VENIPUNCTURE: CPT

## 2020-11-15 PROCEDURE — 96367 TX/PROPH/DG ADDL SEQ IV INF: CPT

## 2020-11-15 NOTE — ULT
EXAM: Soft tissue ultrasound of the left arm



HISTORY: Left upper inner arm swelling



TECHNIQUE: Multiplanar grayscale and color Doppler images were obtained in a targeted ultrasound at t
he area of palpable abnormality in the left arm.



COMPARISON: None



FINDINGS: There is a complex fluid collection in the left upper inner arm measuring 4.0 x 3.0 x 3.6 c
m in size. Flow is seen around the peripherally of the complex collection without internal flow

visualized. This is predominantly anechoic but septations are present within the fluid collection.



IMPRESSION: Complex fluid collection in the left upper inner arm



Reported By: Bakari Bui 

Electronically Signed:  11/15/2020 9:09 PM
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
## 2020-11-16 VITALS — TEMPERATURE: 98.2 F | DIASTOLIC BLOOD PRESSURE: 70 MMHG | SYSTOLIC BLOOD PRESSURE: 125 MMHG

## 2020-11-16 LAB
ANION GAP SERPL CALC-SCNC: 15 MMOL/L (ref 10–20)
BASOPHILS # BLD AUTO: 0 THOU/UL (ref 0–0.2)
BASOPHILS NFR BLD AUTO: 0.2 % (ref 0–1)
BUN SERPL-MCNC: 12 MG/DL (ref 8.9–20.6)
CALCIUM SERPL-MCNC: 8.9 MG/DL (ref 7.8–10.44)
CHLORIDE SERPL-SCNC: 102 MMOL/L (ref 98–107)
CO2 SERPL-SCNC: 26 MMOL/L (ref 22–29)
CREAT CL PREDICTED SERPL C-G-VRATE: 112 ML/MIN (ref 70–130)
EOSINOPHIL # BLD AUTO: 0 THOU/UL (ref 0–0.7)
EOSINOPHIL NFR BLD AUTO: 0.5 % (ref 0–10)
GLUCOSE SERPL-MCNC: 99 MG/DL (ref 70–105)
HGB BLD-MCNC: 12.3 G/DL (ref 14–18)
LYMPHOCYTES # BLD: 0.9 THOU/UL (ref 1.2–3.4)
LYMPHOCYTES NFR BLD AUTO: 11 % (ref 21–51)
MCH RBC QN AUTO: 31.8 PG (ref 27–31)
MCV RBC AUTO: 97.6 FL (ref 78–98)
MONOCYTES # BLD AUTO: 0.7 THOU/UL (ref 0.11–0.59)
MONOCYTES NFR BLD AUTO: 8.5 % (ref 0–10)
NEUTROPHILS # BLD AUTO: 6.4 THOU/UL (ref 1.4–6.5)
NEUTROPHILS NFR BLD AUTO: 79.8 % (ref 42–75)
PLATELET # BLD AUTO: 103 THOU/UL (ref 130–400)
POTASSIUM SERPL-SCNC: 3.9 MMOL/L (ref 3.5–5.1)
RBC # BLD AUTO: 3.86 MILL/UL (ref 4.7–6.1)
SODIUM SERPL-SCNC: 139 MMOL/L (ref 136–145)
WBC # BLD AUTO: 8 THOU/UL (ref 4.8–10.8)

## 2020-11-16 NOTE — PDOC.HOSPP
- Subjective


Encounter Date: 11/16/20


Encounter Time: 10:00


Subjective: 


Patient with improved soreness from wound at inner left upper arm after drainage

in the ER. Wound care and general surgery don't believe it needs further 

debridement.





- Objective


Vital Signs & Weight: 


                             Vital Signs (12 hours)











  Temp Pulse Resp BP BP Pulse Ox


 


 11/16/20 00:30  98.9 F  71  18  124/68  124/68  98








                                     Weight











Weight                         123 lb 0.287 oz














Result Diagrams: 


                                 11/16/20 10:34





                                 11/16/20 10:34





Hospitalist ROS





- Review of Systems


Constitutional: denies: fever, chills


Respiratory: denies: cough, shortness of breath


Cardiovascular: denies: chest pain, palpitations


Gastrointestinal: denies: nausea, vomiting, abdominal pain





- Medication


Medications: 


Active Medications











Generic Name Dose Route Start Last Admin





  Trade Name Freq  PRN Reason Stop Dose Admin


 


Piperacillin Sod/Tazobactam  100 mls @ 200 mls/hr  11/16/20 03:00  11/16/20 

03:30





  Sod 3.375 gm/ Sodium Chloride  IVPB   100 mls





  0300,0900,1500,2100 ARCHANA   Administration


 


Sodium Chloride  1,000 mls @ 100 mls/hr  11/16/20 00:45  11/16/20 00:45





  Normal Saline 0.9%  IV  11/16/20 10:00  1,000 mls





  .Q10H ARCHANA   Administration


 


Vancomycin HCl 750 mg/ Sodium  250 mls @ 250 mls/hr  11/16/20 06:00  11/16/20 

05:49





  Chloride  IVPB   250 mls





  Q8HR ARCHANA   Administration














- Exam


General Appearance: NAD, awake alert


ENT: moist mucosa


Heart: RRR, no murmur, no gallops, no rubs


Respiratory: CTAB, no wheezes, no rales, no ronchi


Gastrointestinal: soft, non-tender, non-distended, normal bowel sounds


Extremities - other findings: LUE with dressing to upper arm, no surrounding re

dness, swelling, drainage


Psychiatric: normal affect, normal behavior





Hosp A/P


(1) Abscess of left upper extremity


Code(s): L02.414 - CUTANEOUS ABSCESS OF LEFT UPPER LIMB   Status: Acute   





(2) Schizophrenia


Code(s): F20.9 - SCHIZOPHRENIA, UNSPECIFIED   Status: Chronic   





(3) Left wrist drop


Code(s): M21.332 - WRIST DROP, LEFT WRIST   Status: Chronic   





- Plan





Patient on Zosyn and Vancomycin since 11/15/2020


Surgical consultation- recommends antibiotics only


Wound care.


No wound culture sent from the ER.


Will d/c on Bactrim and Keflex.
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
## 2021-05-04 ENCOUNTER — HOSPITAL ENCOUNTER (EMERGENCY)
Dept: HOSPITAL 92 - ERS | Age: 26
LOS: 1 days | Discharge: PSYCHIATRIC OR SUBSTANCE USE FACILITY | End: 2021-05-05
Payer: MEDICARE

## 2021-05-04 DIAGNOSIS — Z79.899: ICD-10-CM

## 2021-05-04 DIAGNOSIS — F23: Primary | ICD-10-CM

## 2021-05-04 LAB
ALBUMIN SERPL BCG-MCNC: 3.8 G/DL (ref 3.5–5)
ALP SERPL-CCNC: 45 U/L (ref 40–110)
ALT SERPL W P-5'-P-CCNC: 22 U/L (ref 8–55)
ANION GAP SERPL CALC-SCNC: 12 MMOL/L (ref 10–20)
APAP SERPL-MCNC: (no result) MCG/ML (ref 10–30)
AST SERPL-CCNC: 21 U/L (ref 5–34)
BASOPHILS # BLD AUTO: 0 THOU/UL (ref 0–0.2)
BASOPHILS NFR BLD AUTO: 0.5 % (ref 0–1)
BILIRUB SERPL-MCNC: 0.5 MG/DL (ref 0.2–1.2)
BUN SERPL-MCNC: 20 MG/DL (ref 8.9–20.6)
CALCIUM SERPL-MCNC: 8.5 MG/DL (ref 7.8–10.44)
CHLORIDE SERPL-SCNC: 108 MMOL/L (ref 98–107)
CO2 SERPL-SCNC: 23 MMOL/L (ref 22–29)
CREAT CL PREDICTED SERPL C-G-VRATE: 0 ML/MIN (ref 70–130)
DRUG SCREEN CUTOFF: (no result)
EOSINOPHIL # BLD AUTO: 0 THOU/UL (ref 0–0.7)
EOSINOPHIL NFR BLD AUTO: 0 % (ref 0–10)
GLOBULIN SER CALC-MCNC: 2.5 G/DL (ref 2.4–3.5)
GLUCOSE SERPL-MCNC: 125 MG/DL (ref 70–105)
HGB BLD-MCNC: 12.3 G/DL (ref 14–18)
LYMPHOCYTES # BLD: 0.7 THOU/UL (ref 1.2–3.4)
LYMPHOCYTES NFR BLD AUTO: 8.4 % (ref 21–51)
MCH RBC QN AUTO: 30.9 PG (ref 27–31)
MCV RBC AUTO: 95.1 FL (ref 78–98)
MEDTOX CONTROL LINE VALID?: (no result)
MEDTOX READER #: (no result)
MONOCYTES # BLD AUTO: 0.9 THOU/UL (ref 0.11–0.59)
MONOCYTES NFR BLD AUTO: 10.1 % (ref 0–10)
NEUTROPHILS # BLD AUTO: 7 THOU/UL (ref 1.4–6.5)
NEUTROPHILS NFR BLD AUTO: 81 % (ref 42–75)
PLATELET # BLD AUTO: 149 THOU/UL (ref 130–400)
POTASSIUM SERPL-SCNC: 3.9 MMOL/L (ref 3.5–5.1)
RBC # BLD AUTO: 3.99 MILL/UL (ref 4.7–6.1)
SALICYLATES SERPL-MCNC: (no result) MG/DL (ref 15–30)
SODIUM SERPL-SCNC: 139 MMOL/L (ref 136–145)
WBC # BLD AUTO: 8.6 THOU/UL (ref 4.8–10.8)

## 2021-05-04 PROCEDURE — 96372 THER/PROPH/DIAG INJ SC/IM: CPT

## 2021-05-04 PROCEDURE — 80307 DRUG TEST PRSMV CHEM ANLYZR: CPT

## 2021-05-04 PROCEDURE — 84443 ASSAY THYROID STIM HORMONE: CPT

## 2021-05-04 PROCEDURE — 99285 EMERGENCY DEPT VISIT HI MDM: CPT

## 2021-05-04 PROCEDURE — 36415 COLL VENOUS BLD VENIPUNCTURE: CPT

## 2021-05-04 PROCEDURE — 85025 COMPLETE CBC W/AUTO DIFF WBC: CPT

## 2021-05-04 PROCEDURE — 93005 ELECTROCARDIOGRAM TRACING: CPT

## 2021-05-04 PROCEDURE — U0002 COVID-19 LAB TEST NON-CDC: HCPCS

## 2021-05-04 PROCEDURE — 80053 COMPREHEN METABOLIC PANEL: CPT

## 2021-05-04 PROCEDURE — 80306 DRUG TEST PRSMV INSTRMNT: CPT

## 2021-10-04 ENCOUNTER — HOSPITAL ENCOUNTER (EMERGENCY)
Dept: HOSPITAL 92 - ERS | Age: 26
Discharge: HOME | End: 2021-10-04
Payer: MEDICARE
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
## 2021-10-04 DIAGNOSIS — S60.511A: Primary | ICD-10-CM
